# Patient Record
Sex: MALE | Race: WHITE | Employment: OTHER | URBAN - METROPOLITAN AREA
[De-identification: names, ages, dates, MRNs, and addresses within clinical notes are randomized per-mention and may not be internally consistent; named-entity substitution may affect disease eponyms.]

---

## 2024-02-08 ENCOUNTER — HOSPITAL ENCOUNTER (EMERGENCY)
Age: 84
Discharge: HOME | End: 2024-02-08
Attending: EMERGENCY MEDICINE
Payer: MEDICARE

## 2024-02-08 VITALS
SYSTOLIC BLOOD PRESSURE: 167 MMHG | BODY MASS INDEX: 27.2 KG/M2 | RESPIRATION RATE: 16 BRPM | DIASTOLIC BLOOD PRESSURE: 84 MMHG | OXYGEN SATURATION: 98 % | HEART RATE: 58 BPM | WEIGHT: 200.84 LBS | TEMPERATURE: 98.5 F | HEIGHT: 72 IN

## 2024-02-08 DIAGNOSIS — R41.89 COGNITIVE CHANGES: Primary | ICD-10-CM

## 2024-02-08 PROCEDURE — 99283 EMERGENCY DEPT VISIT LOW MDM: CPT

## 2024-02-08 ASSESSMENT — LIFESTYLE VARIABLES
HOW MANY STANDARD DRINKS CONTAINING ALCOHOL DO YOU HAVE ON A TYPICAL DAY: PATIENT DOES NOT DRINK
HOW OFTEN DO YOU HAVE A DRINK CONTAINING ALCOHOL: NEVER

## 2024-02-08 ASSESSMENT — PAIN - FUNCTIONAL ASSESSMENT: PAIN_FUNCTIONAL_ASSESSMENT: NONE - DENIES PAIN

## 2024-02-08 ASSESSMENT — ENCOUNTER SYMPTOMS
RHINORRHEA: 0
DIARRHEA: 0
ABDOMINAL PAIN: 0
COLOR CHANGE: 0
BACK PAIN: 0
VOMITING: 0
SHORTNESS OF BREATH: 0
COUGH: 0
NAUSEA: 0
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
## 2024-02-08 NOTE — ED NOTES
Contacted patient's representative to notify him of visit here and findings. Patient is being sent back to facility.  Still unable to contact facility regarding patients return.

## 2024-02-08 NOTE — ED NOTES
I have reviewed discharge instructions with the patient and POA.  The POA verbalized understanding.    Patient left ED via Discharge Method: stretcher to Skilled nursing facility with [facility].  Opportunity for questions and clarification provided.       Patient given 0 scripts.         To continue your aftercare when you leave the hospital, you may receive an automated call from our care team to check in on how you are doing.  This is a free service and part of our promise to provide the best care and service to meet your aftercare needs.” If you have questions, or wish to unsubscribe from this service please call [phone].  Thank you for Choosing our [facility] Emergency Department.

## 2024-02-08 NOTE — ED PROVIDER NOTES
Genitourinary:  Negative for dysuria, frequency and hematuria.   Musculoskeletal:  Negative for back pain and myalgias.   Skin:  Negative for color change and rash.   Neurological:  Negative for weakness and numbness.   Psychiatric/Behavioral:  Negative for agitation, hallucinations and suicidal ideas. The patient is not nervous/anxious.         Patient denies homicidal or harmful intention   All other systems reviewed and are negative.      Physical Exam     Vitals signs and nursing note reviewed:  Vitals:    02/08/24 1651   BP: (!) 167/84   Pulse: 58   Resp: 16   Temp: 98.5 °F (36.9 °C)   TempSrc: Oral   SpO2: 98%   Weight: 91.1 kg (200 lb 13.4 oz)   Height: 1.829 m (6')      Physical Exam  Vitals and nursing note reviewed.   Constitutional:       Appearance: Normal appearance.   HENT:      Head: Normocephalic and atraumatic.      Nose: Nose normal.      Mouth/Throat:      Mouth: Mucous membranes are moist.   Eyes:      Conjunctiva/sclera: Conjunctivae normal.      Pupils: Pupils are equal, round, and reactive to light.   Cardiovascular:      Rate and Rhythm: Normal rate and regular rhythm.      Pulses: Normal pulses.      Heart sounds: Normal heart sounds.   Pulmonary:      Effort: Pulmonary effort is normal.      Breath sounds: Normal breath sounds.   Abdominal:      General: There is no distension.      Palpations: Abdomen is soft.      Tenderness: There is no abdominal tenderness. There is no guarding or rebound.   Musculoskeletal:         General: Normal range of motion.   Skin:     General: Skin is warm and dry.   Neurological:      Mental Status: He is alert and oriented to person, place, and time.      Cranial Nerves: No cranial nerve deficit.      Sensory: No sensory deficit.      Motor: No weakness.      Coordination: Coordination normal.   Psychiatric:         Behavior: Behavior normal.          Procedures     Procedures    No orders of the defined types were placed in this encounter.    Dysphagia, unspecified type

## 2024-02-08 NOTE — DISCHARGE INSTRUCTIONS
Follow-up with Dr. Morataya and Dr. Marlow in the next couple of weeks for follow-up and recheck.  No medication recommendations at this time.  No sign of psychiatric instability at this time

## 2024-05-23 ENCOUNTER — HOSPITAL ENCOUNTER (EMERGENCY)
Age: 84
Discharge: HOME | End: 2024-05-23
Attending: STUDENT IN AN ORGANIZED HEALTH CARE EDUCATION/TRAINING PROGRAM
Payer: MEDICARE

## 2024-05-23 VITALS
TEMPERATURE: 98.2 F | WEIGHT: 185 LBS | RESPIRATION RATE: 16 BRPM | HEIGHT: 73 IN | HEART RATE: 56 BPM | DIASTOLIC BLOOD PRESSURE: 70 MMHG | OXYGEN SATURATION: 98 % | SYSTOLIC BLOOD PRESSURE: 122 MMHG | BODY MASS INDEX: 24.52 KG/M2

## 2024-05-23 DIAGNOSIS — R45.1 AGITATION: Primary | ICD-10-CM

## 2024-05-23 LAB
ALBUMIN SERPL-MCNC: 3.5 G/DL (ref 3.2–4.6)
ALBUMIN/GLOB SERPL: 1.2 (ref 1–1.9)
ALP SERPL-CCNC: 73 U/L (ref 40–129)
ALT SERPL-CCNC: 16 U/L (ref 12–65)
ANION GAP SERPL CALC-SCNC: 11 MMOL/L (ref 9–18)
APPEARANCE UR: CLEAR
AST SERPL-CCNC: 35 U/L (ref 15–37)
BACTERIA URNS QL MICRO: ABNORMAL /HPF
BASOPHILS # BLD: 0 K/UL (ref 0–0.2)
BASOPHILS NFR BLD: 1 % (ref 0–2)
BILIRUB SERPL-MCNC: <0.2 MG/DL (ref 0–1.2)
BILIRUB UR QL: NEGATIVE
BUN SERPL-MCNC: 25 MG/DL (ref 8–23)
CALCIUM SERPL-MCNC: 9.1 MG/DL (ref 8.8–10.2)
CASTS URNS QL MICRO: 0 /LPF
CHLORIDE SERPL-SCNC: 105 MMOL/L (ref 98–107)
CO2 SERPL-SCNC: 25 MMOL/L (ref 20–28)
COLOR UR: ABNORMAL
CREAT SERPL-MCNC: 1.06 MG/DL (ref 0.8–1.3)
CRYSTALS URNS QL MICRO: 0 /LPF
DIFFERENTIAL METHOD BLD: ABNORMAL
EOSINOPHIL # BLD: 0.3 K/UL (ref 0–0.8)
EOSINOPHIL NFR BLD: 4 % (ref 0.5–7.8)
EPI CELLS #/AREA URNS HPF: ABNORMAL /HPF
ERYTHROCYTE [DISTWIDTH] IN BLOOD BY AUTOMATED COUNT: 12.8 % (ref 11.9–14.6)
GLOBULIN SER CALC-MCNC: 2.9 G/DL (ref 2.3–3.5)
GLUCOSE SERPL-MCNC: 132 MG/DL (ref 70–99)
GLUCOSE UR STRIP.AUTO-MCNC: NEGATIVE MG/DL
HCT VFR BLD AUTO: 40.5 % (ref 41.1–50.3)
HGB BLD-MCNC: 13 G/DL (ref 13.6–17.2)
HGB UR QL STRIP: NEGATIVE
IMM GRANULOCYTES # BLD AUTO: 0 K/UL (ref 0–0.5)
IMM GRANULOCYTES NFR BLD AUTO: 0 % (ref 0–5)
KETONES UR QL STRIP.AUTO: NEGATIVE MG/DL
LEUKOCYTE ESTERASE UR QL STRIP.AUTO: NEGATIVE
LYMPHOCYTES # BLD: 2.8 K/UL (ref 0.5–4.6)
LYMPHOCYTES NFR BLD: 37 % (ref 13–44)
MCH RBC QN AUTO: 30.7 PG (ref 26.1–32.9)
MCHC RBC AUTO-ENTMCNC: 32.1 G/DL (ref 31.4–35)
MCV RBC AUTO: 95.5 FL (ref 82–102)
MONOCYTES # BLD: 0.8 K/UL (ref 0.1–1.3)
MONOCYTES NFR BLD: 11 % (ref 4–12)
MUCOUS THREADS URNS QL MICRO: ABNORMAL /LPF
NEUTS SEG # BLD: 3.7 K/UL (ref 1.7–8.2)
NEUTS SEG NFR BLD: 47 % (ref 43–78)
NITRITE UR QL STRIP.AUTO: NEGATIVE
NRBC # BLD: 0 K/UL (ref 0–0.2)
OTHER OBSERVATIONS: ABNORMAL
PH UR STRIP: 5.5 (ref 5–9)
PLATELET # BLD AUTO: 246 K/UL (ref 150–450)
PMV BLD AUTO: 9.4 FL (ref 9.4–12.3)
POTASSIUM SERPL-SCNC: ABNORMAL MMOL/L (ref 3.5–5.1)
PROT SERPL-MCNC: 6.5 G/DL (ref 6.3–8.2)
PROT UR STRIP-MCNC: NEGATIVE MG/DL
RBC # BLD AUTO: 4.24 M/UL (ref 4.23–5.6)
RBC #/AREA URNS HPF: ABNORMAL /HPF
SODIUM SERPL-SCNC: 141 MMOL/L (ref 136–145)
SP GR UR REFRACTOMETRY: 1.03 (ref 1–1.02)
URINE CULTURE IF INDICATED: ABNORMAL
UROBILINOGEN UR QL STRIP.AUTO: 1 EU/DL (ref 0.2–1)
WBC # BLD AUTO: 7.6 K/UL (ref 4.3–11.1)
WBC URNS QL MICRO: ABNORMAL /HPF

## 2024-05-23 PROCEDURE — 85025 COMPLETE CBC W/AUTO DIFF WBC: CPT

## 2024-05-23 PROCEDURE — 51701 INSERT BLADDER CATHETER: CPT

## 2024-05-23 PROCEDURE — 6370000000 HC RX 637 (ALT 250 FOR IP): Performed by: EMERGENCY MEDICINE

## 2024-05-23 PROCEDURE — 99283 EMERGENCY DEPT VISIT LOW MDM: CPT

## 2024-05-23 PROCEDURE — 81001 URINALYSIS AUTO W/SCOPE: CPT

## 2024-05-23 PROCEDURE — 80053 COMPREHEN METABOLIC PANEL: CPT

## 2024-05-23 RX ORDER — OLANZAPINE 5 MG/1
5 TABLET, ORALLY DISINTEGRATING ORAL
Status: COMPLETED | OUTPATIENT
Start: 2024-05-23 | End: 2024-05-23

## 2024-05-23 RX ADMIN — OLANZAPINE 5 MG: 5 TABLET, ORALLY DISINTEGRATING ORAL at 19:47

## 2024-05-23 ASSESSMENT — PAIN - FUNCTIONAL ASSESSMENT
PAIN_FUNCTIONAL_ASSESSMENT: 0-10
PAIN_FUNCTIONAL_ASSESSMENT: 0-10

## 2024-05-23 ASSESSMENT — PAIN SCALES - GENERAL
PAINLEVEL_OUTOF10: 0
PAINLEVEL_OUTOF10: 0

## 2024-05-23 ASSESSMENT — ENCOUNTER SYMPTOMS: ABDOMINAL PAIN: 0

## 2024-05-23 ASSESSMENT — LIFESTYLE VARIABLES
HOW OFTEN DO YOU HAVE A DRINK CONTAINING ALCOHOL: NEVER
HOW MANY STANDARD DRINKS CONTAINING ALCOHOL DO YOU HAVE ON A TYPICAL DAY: PATIENT DOES NOT DRINK
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
## 2024-05-23 NOTE — ED PROVIDER NOTES
YELLOW/STRAW      Appearance CLEAR      Specific Gravity, UA 1.028 (H) 1.001 - 1.023      pH, Urine 5.5 5.0 - 9.0      Protein, UA Negative NEG mg/dL    Glucose, Ur Negative mg/dL    Ketones, Urine Negative NEG mg/dL    Bilirubin, Urine Negative NEG      Blood, Urine Negative NEG      Urobilinogen, Urine 1.0 0.2 - 1.0 EU/dL    Nitrite, Urine Negative NEG      Leukocyte Esterase, Urine Negative NEG      WBC, UA PENDING /hpf    RBC, UA PENDING /hpf    BACTERIA, URINE PENDING /hpf    Urine Culture if Indicated PENDING    CBC with Auto Differential   Result Value Ref Range    WBC 7.6 4.3 - 11.1 K/uL    RBC 4.24 4.23 - 5.6 M/uL    Hemoglobin 13.0 (L) 13.6 - 17.2 g/dL    Hematocrit 40.5 (L) 41.1 - 50.3 %    MCV 95.5 82 - 102 FL    MCH 30.7 26.1 - 32.9 PG    MCHC 32.1 31.4 - 35.0 g/dL    RDW 12.8 11.9 - 14.6 %    Platelets 246 150 - 450 K/uL    MPV 9.4 9.4 - 12.3 FL    nRBC 0.00 0.0 - 0.2 K/uL    Differential Type AUTOMATED      Neutrophils % 47 43 - 78 %    Lymphocytes % 37 13 - 44 %    Monocytes % 11 4.0 - 12.0 %    Eosinophils % 4 0.5 - 7.8 %    Basophils % 1 0.0 - 2.0 %    Immature Granulocytes % 0 0.0 - 5.0 %    Neutrophils Absolute 3.7 1.7 - 8.2 K/UL    Lymphocytes Absolute 2.8 0.5 - 4.6 K/UL    Monocytes Absolute 0.8 0.1 - 1.3 K/UL    Eosinophils Absolute 0.3 0.0 - 0.8 K/UL    Basophils Absolute 0.0 0.0 - 0.2 K/UL    Immature Granulocytes Absolute 0.0 0.0 - 0.5 K/UL   CMP   Result Value Ref Range    Sodium 141 136 - 145 mmol/L    Potassium HEMOLYZED SPECIMEN 3.5 - 5.1 mmol/L    Chloride 105 98 - 107 mmol/L    CO2 25 20 - 28 mmol/L    Anion Gap 11 9 - 18 mmol/L    Glucose 132 (H) 70 - 99 mg/dL    BUN 25 (H) 8 - 23 MG/DL    Creatinine 1.06 0.80 - 1.30 MG/DL    Est, Glom Filt Rate 70 >60 ml/min/1.73m2    Calcium 9.1 8.8 - 10.2 MG/DL    Total Bilirubin <0.2 0.0 - 1.2 MG/DL    ALT 16 12 - 65 U/L    AST 35 15 - 37 U/L    Alk Phosphatase 73 40 - 129 U/L    Total Protein 6.5 6.3 - 8.2 g/dL    Albumin 3.5 3.2 - 4.6 g/dL

## 2024-05-24 NOTE — ED NOTES
Patient mobility status  with no difficulty. Provider aware     I have reviewed discharge instructions with the patient.  The patient verbalized understanding.    Patient left ED via Discharge Method: ambulatory to Home with [facility].    Opportunity for questions and clarification provided.     Patient given 0 scripts.           Willy Lopez RN  05/23/24 [id number]

## 2024-07-22 ENCOUNTER — APPOINTMENT (OUTPATIENT)
Dept: GENERAL RADIOLOGY | Age: 84
End: 2024-07-22
Payer: MEDICARE

## 2024-07-22 ENCOUNTER — HOSPITAL ENCOUNTER (EMERGENCY)
Age: 84
Discharge: ANOTHER HOSPITAL | End: 2024-07-23
Attending: EMERGENCY MEDICINE
Payer: MEDICARE

## 2024-07-22 VITALS
TEMPERATURE: 100.3 F | WEIGHT: 175 LBS | BODY MASS INDEX: 25.05 KG/M2 | HEIGHT: 70 IN | SYSTOLIC BLOOD PRESSURE: 135 MMHG | DIASTOLIC BLOOD PRESSURE: 69 MMHG | RESPIRATION RATE: 18 BRPM | OXYGEN SATURATION: 94 % | HEART RATE: 75 BPM

## 2024-07-22 DIAGNOSIS — R53.1 GENERALIZED WEAKNESS: ICD-10-CM

## 2024-07-22 DIAGNOSIS — Z78.9 ACTIVITY OF DAILY LIVING ALTERATION: ICD-10-CM
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
## 2024-07-22 DIAGNOSIS — R50.9 FEVER, UNSPECIFIED FEVER CAUSE: ICD-10-CM

## 2024-07-22 DIAGNOSIS — D72.829 LEUKOCYTOSIS, UNSPECIFIED TYPE: ICD-10-CM

## 2024-07-22 LAB
ALBUMIN SERPL-MCNC: 2.6 G/DL (ref 3.2–4.6)
ALBUMIN/GLOB SERPL: 0.8 (ref 1–1.9)
ALP SERPL-CCNC: 113 U/L (ref 40–129)
ALT SERPL-CCNC: 14 U/L (ref 12–65)
ANION GAP SERPL CALC-SCNC: 13 MMOL/L (ref 9–18)
AST SERPL-CCNC: 19 U/L (ref 15–37)
BASOPHILS # BLD: 0.1 K/UL (ref 0–0.2)
BASOPHILS NFR BLD: 0 % (ref 0–2)
BILIRUB SERPL-MCNC: 0.5 MG/DL (ref 0–1.2)
BILIRUB UR QL: NEGATIVE
BUN SERPL-MCNC: 14 MG/DL (ref 8–23)
CALCIUM SERPL-MCNC: 8.3 MG/DL (ref 8.8–10.2)
CHLORIDE SERPL-SCNC: 102 MMOL/L (ref 98–107)
CO2 SERPL-SCNC: 22 MMOL/L (ref 20–28)
CREAT SERPL-MCNC: 0.88 MG/DL (ref 0.8–1.3)
DIFFERENTIAL METHOD BLD: ABNORMAL
EOSINOPHIL # BLD: 0 K/UL (ref 0–0.8)
EOSINOPHIL NFR BLD: 0 % (ref 0.5–7.8)
ERYTHROCYTE [DISTWIDTH] IN BLOOD BY AUTOMATED COUNT: 12.5 % (ref 11.9–14.6)
GLOBULIN SER CALC-MCNC: 3.3 G/DL (ref 2.3–3.5)
GLUCOSE SERPL-MCNC: 114 MG/DL (ref 70–99)
GLUCOSE UR QL STRIP.AUTO: NEGATIVE MG/DL
HCT VFR BLD AUTO: 34.7 % (ref 41.1–50.3)
HGB BLD-MCNC: 11.2 G/DL (ref 13.6–17.2)
IMM GRANULOCYTES # BLD AUTO: 0.2 K/UL (ref 0–0.5)
IMM GRANULOCYTES NFR BLD AUTO: 1 % (ref 0–5)
KETONES UR-MCNC: ABNORMAL MG/DL
LACTATE SERPL-SCNC: 1 MMOL/L (ref 0.5–2)
LEUKOCYTE ESTERASE UR QL STRIP: NEGATIVE
LYMPHOCYTES # BLD: 1.5 K/UL (ref 0.5–4.6)
LYMPHOCYTES NFR BLD: 10 % (ref 13–44)
MAGNESIUM SERPL-MCNC: 2.1 MG/DL (ref 1.8–2.4)
MCH RBC QN AUTO: 30.5 PG (ref 26.1–32.9)
MCHC RBC AUTO-ENTMCNC: 32.3 G/DL (ref 31.4–35)
MCV RBC AUTO: 94.6 FL (ref 82–102)
MONOCYTES # BLD: 1.9 K/UL (ref 0.1–1.3)
MONOCYTES NFR BLD: 13 % (ref 4–12)
NEUTS SEG # BLD: 11.5 K/UL (ref 1.7–8.2)
NEUTS SEG NFR BLD: 76 % (ref 43–78)
NITRITE UR QL: NEGATIVE
NRBC # BLD: 0 K/UL (ref 0–0.2)
PH UR: 5.5 (ref 5–9)
PLATELET # BLD AUTO: 307 K/UL (ref 150–450)
PMV BLD AUTO: 9.4 FL (ref 9.4–12.3)
POTASSIUM SERPL-SCNC: 4 MMOL/L (ref 3.5–5.1)
PROCALCITONIN SERPL-MCNC: 0.21 NG/ML (ref 0–0.1)
PROT SERPL-MCNC: 5.8 G/DL (ref 6.3–8.2)
PROT UR QL: 30 MG/DL
RBC # BLD AUTO: 3.67 M/UL (ref 4.23–5.6)
RBC # UR STRIP: NEGATIVE
SARS-COV-2 RDRP RESP QL NAA+PROBE: NOT DETECTED
SERVICE CMNT-IMP: ABNORMAL
SODIUM SERPL-SCNC: 137 MMOL/L (ref 136–145)
SOURCE: NORMAL
SP GR UR: 1.02 (ref 1–1.02)
UROBILINOGEN UR QL: 4 EU/DL (ref 0.2–1)
WBC # BLD AUTO: 15.2 K/UL (ref 4.3–11.1)

## 2024-07-22 PROCEDURE — 84145 PROCALCITONIN (PCT): CPT

## 2024-07-22 PROCEDURE — 96367 TX/PROPH/DG ADDL SEQ IV INF: CPT

## 2024-07-22 PROCEDURE — 87086 URINE CULTURE/COLONY COUNT: CPT

## 2024-07-22 PROCEDURE — 99285 EMERGENCY DEPT VISIT HI MDM: CPT

## 2024-07-22 PROCEDURE — 96366 THER/PROPH/DIAG IV INF ADDON: CPT

## 2024-07-22 PROCEDURE — 6360000002 HC RX W HCPCS

## 2024-07-22 PROCEDURE — 2580000003 HC RX 258

## 2024-07-22 PROCEDURE — 85025 COMPLETE CBC W/AUTO DIFF WBC: CPT

## 2024-07-22 PROCEDURE — 80053 COMPREHEN METABOLIC PANEL: CPT

## 2024-07-22 PROCEDURE — 96365 THER/PROPH/DIAG IV INF INIT: CPT

## 2024-07-22 PROCEDURE — 81001 URINALYSIS AUTO W/SCOPE: CPT

## 2024-07-22 PROCEDURE — 36415 COLL VENOUS BLD VENIPUNCTURE: CPT

## 2024-07-22 PROCEDURE — 87635 SARS-COV-2 COVID-19 AMP PRB: CPT

## 2024-07-22 PROCEDURE — 83605 ASSAY OF LACTIC ACID: CPT

## 2024-07-22 PROCEDURE — 87040 BLOOD CULTURE FOR BACTERIA: CPT

## 2024-07-22 PROCEDURE — 83735 ASSAY OF MAGNESIUM: CPT

## 2024-07-22 PROCEDURE — 71046 X-RAY EXAM CHEST 2 VIEWS: CPT

## 2024-07-22 RX ORDER — DONEPEZIL HYDROCHLORIDE 10 MG/1
10 TABLET, FILM COATED ORAL DAILY
COMMUNITY
Start: 2022-10-24

## 2024-07-22 RX ORDER — HYDROXYZINE PAMOATE 50 MG/1
CAPSULE ORAL
COMMUNITY
Start: 2024-05-23

## 2024-07-22 RX ORDER — DIPHENHYDRAMINE HCL 25 MG
25 CAPSULE ORAL EVERY 6 HOURS PRN
COMMUNITY

## 2024-07-22 RX ORDER — LANOLIN ALCOHOL/MO/W.PET/CERES
3 CREAM (GRAM) TOPICAL DAILY
COMMUNITY

## 2024-07-22 RX ORDER — TRIAMCINOLONE ACETONIDE 0.25 MG/ML
LOTION TOPICAL
Status: ON HOLD | COMMUNITY
Start: 2023-01-12 | End: 2024-07-24 | Stop reason: HOSPADM

## 2024-07-22 RX ORDER — 0.9 % SODIUM CHLORIDE 0.9 %
1000 INTRAVENOUS SOLUTION INTRAVENOUS ONCE
Status: COMPLETED | OUTPATIENT
Start: 2024-07-22 | End: 2024-07-22

## 2024-07-22 RX ADMIN — CEFTRIAXONE SODIUM 2000 MG: 2 INJECTION, POWDER, FOR SOLUTION INTRAMUSCULAR; INTRAVENOUS at 21:08

## 2024-07-22 RX ADMIN — SODIUM CHLORIDE 1000 ML: 9 INJECTION, SOLUTION INTRAVENOUS at 20:13

## 2024-07-22 NOTE — ED TRIAGE NOTES
Pt's friend advises that pt lives in memory care SNF. Pt had an unwitnessed fall today as well as difficulty walking without dizziness. Pt CO chest congestion and back pain. Last dose of Tylenol approx x30 minutes PTA.

## 2024-07-22 NOTE — ED PROVIDER NOTES
Emergency Department Provider Note       PCP: Adonay Morataya MD   Age: 84 y.o.   Sex: male     DISPOSITION       No diagnosis found.    Medical Decision Making     Elderly male presents from SNF due to concerns of cough with fevers and unwitnessed fall.  Arrives with borderline fever at 100.3 F, otherwise with stable vital signs.  No neurodeficits or significant tender areas or evidence of significant trauma at the time of my evaluation.  Patient is not anticoagulated therefore imaging deferred at this time.  Will plan for broad workup given limited history.    Labs demonstrate moderate leukocytosis of 15,000.  Procalcitonin is minimally elevated.  Lactic acid within normal limits.  COVID-negative.  UA is negative.  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    At time of shift change, patient CMP is still pending due to multiple recollection attempts from hemolysis.    11:02 PM EDT The patient's care will be transitioned to Dr. Livingston.  At this time, the plan is likely admit the patient due to generalized weakness in the setting of leukocytosis and unwitnessed fall.  Please see that provider's documentation for further information.              I independently ordered and reviewed each unique test.     The patients assessment required an independent historian: son.  The reason they were needed is dementia and important historical information not provided by the patient.  I interpreted the X-rays ill-defined right upper lobe opacity which appears chronic.        History     Patient is an 84-year-old male with history of dementia residing in SNF memory care presenting to the emergency department accompanied by son with concern for persistent cough, fevers, and unwitnessed fall at the nursing facility.  History primarily provided by son due to history of dementia.  Son states

## 2024-07-23 ENCOUNTER — APPOINTMENT (OUTPATIENT)
Dept: CT IMAGING | Age: 84
End: 2024-07-23
Payer: MEDICARE

## 2024-07-23 ENCOUNTER — HOSPITAL ENCOUNTER (OUTPATIENT)
Age: 84
Setting detail: OBSERVATION
LOS: 1 days | Discharge: NURSING, REHAB OR LONG-TERM CARE | End: 2024-07-24
Attending: STUDENT IN AN ORGANIZED HEALTH CARE EDUCATION/TRAINING PROGRAM | Admitting: INTERNAL MEDICINE
Payer: MEDICARE

## 2024-07-23 PROBLEM — J18.9 PNEUMONIA DUE TO INFECTIOUS ORGANISM: Status: ACTIVE | Noted: 2024-07-23

## 2024-07-23 PROBLEM — J15.9 COMMUNITY ACQUIRED BACTERIAL PNEUMONIA: Status: ACTIVE | Noted: 2024-07-23

## 2024-07-23 LAB
BACTERIA URNS QL MICRO: 0 /HPF
CASTS URNS QL MICRO: 0 /LPF
CRYSTALS URNS QL MICRO: 0 /LPF
EPI CELLS #/AREA URNS HPF: ABNORMAL /HPF
MUCOUS THREADS URNS QL MICRO: ABNORMAL /LPF
RBC #/AREA URNS HPF: ABNORMAL /HPF
WBC URNS QL MICRO: ABNORMAL /HPF

## 2024-07-23 PROCEDURE — 97161 PT EVAL LOW COMPLEX 20 MIN: CPT

## 2024-07-23 PROCEDURE — 6370000000 HC RX 637 (ALT 250 FOR IP): Performed by: INTERNAL MEDICINE

## 2024-07-23 PROCEDURE — 96367 TX/PROPH/DG ADDL SEQ IV INF: CPT

## 2024-07-23 PROCEDURE — 2580000003 HC RX 258: Performed by: INTERNAL MEDICINE

## 2024-07-23 PROCEDURE — 6360000002 HC RX W HCPCS: Performed by: INTERNAL MEDICINE

## 2024-07-23 PROCEDURE — 70450 CT HEAD/BRAIN W/O DYE: CPT

## 2024-07-23 PROCEDURE — 71250 CT THORAX DX C-: CPT

## 2024-07-23 PROCEDURE — 6360000002 HC RX W HCPCS: Performed by: EMERGENCY MEDICINE

## 2024-07-23 PROCEDURE — 2580000003 HC RX 258: Performed by: EMERGENCY MEDICINE

## 2024-07-23 PROCEDURE — 1100000000 HC RM PRIVATE

## 2024-07-23 PROCEDURE — 97530 THERAPEUTIC ACTIVITIES: CPT

## 2024-07-23 RX ORDER — SODIUM CHLORIDE 0.9 % (FLUSH) 0.9 %
5-40 SYRINGE (ML) INJECTION EVERY 12 HOURS SCHEDULED
Status: DISCONTINUED | OUTPATIENT
Start: 2024-07-23 | End: 2024-07-24 | Stop reason: HOSPADM

## 2024-07-23 RX ORDER — SODIUM CHLORIDE 0.9 % (FLUSH) 0.9 %
5-40 SYRINGE (ML) INJECTION PRN
Status: DISCONTINUED | OUTPATIENT
Start: 2024-07-23 | End: 2024-07-24 | Stop reason: HOSPADM

## 2024-07-23 RX ORDER — GUAIFENESIN 600 MG/1
600 TABLET, EXTENDED RELEASE ORAL 2 TIMES DAILY
Status: DISCONTINUED | OUTPATIENT
Start: 2024-07-23 | End: 2024-07-23

## 2024-07-23 RX ORDER — AZITHROMYCIN 250 MG/1
500 TABLET, FILM COATED ORAL EVERY 24 HOURS
Status: DISCONTINUED | OUTPATIENT
Start: 2024-07-24 | End: 2024-07-23 | Stop reason: SDUPTHER

## 2024-07-23 RX ORDER — POLYETHYLENE GLYCOL 3350 17 G/17G
17 POWDER, FOR SOLUTION ORAL DAILY PRN
Status: DISCONTINUED | OUTPATIENT
Start: 2024-07-23 | End: 2024-07-24 | Stop reason: HOSPADM

## 2024-07-23 RX ORDER — OLANZAPINE 5 MG/1
10 TABLET, ORALLY DISINTEGRATING ORAL 2 TIMES DAILY PRN
Status: DISCONTINUED | OUTPATIENT
Start: 2024-07-23 | End: 2024-07-24 | Stop reason: HOSPADM

## 2024-07-23 RX ORDER — SODIUM CHLORIDE 9 MG/ML
INJECTION, SOLUTION INTRAVENOUS PRN
Status: DISCONTINUED | OUTPATIENT
Start: 2024-07-23 | End: 2024-07-24 | Stop reason: HOSPADM

## 2024-07-23 RX ORDER — LANOLIN ALCOHOL/MO/W.PET/CERES
3 CREAM (GRAM) TOPICAL NIGHTLY
Status: DISCONTINUED | OUTPATIENT
Start: 2024-07-23 | End: 2024-07-24 | Stop reason: HOSPADM

## 2024-07-23 RX ORDER — GUAIFENESIN/DEXTROMETHORPHAN 100-10MG/5
10 SYRUP ORAL EVERY 4 HOURS PRN
Status: DISCONTINUED | OUTPATIENT
Start: 2024-07-23 | End: 2024-07-24 | Stop reason: HOSPADM

## 2024-07-23 RX ORDER — QUETIAPINE FUMARATE 100 MG/1
50 TABLET, FILM COATED ORAL 2 TIMES DAILY PRN
Status: DISCONTINUED | OUTPATIENT
Start: 2024-07-23 | End: 2024-07-24 | Stop reason: HOSPADM

## 2024-07-23 RX ORDER — ACETAMINOPHEN 650 MG/1
650 SUPPOSITORY RECTAL EVERY 6 HOURS PRN
Status: DISCONTINUED | OUTPATIENT
Start: 2024-07-23 | End: 2024-07-24 | Stop reason: HOSPADM

## 2024-07-23 RX ORDER — DIPHENHYDRAMINE HCL 25 MG
25 CAPSULE ORAL EVERY 6 HOURS PRN
Status: DISCONTINUED | OUTPATIENT
Start: 2024-07-23 | End: 2024-07-24 | Stop reason: HOSPADM

## 2024-07-23 RX ORDER — MAGNESIUM HYDROXIDE/ALUMINUM HYDROXICE/SIMETHICONE 120; 1200; 1200 MG/30ML; MG/30ML; MG/30ML
30 SUSPENSION ORAL EVERY 6 HOURS PRN
Status: DISCONTINUED | OUTPATIENT
Start: 2024-07-23 | End: 2024-07-24 | Stop reason: HOSPADM

## 2024-07-23 RX ORDER — AZITHROMYCIN 250 MG/1
250 TABLET, FILM COATED ORAL DAILY
Status: DISCONTINUED | OUTPATIENT
Start: 2024-07-24 | End: 2024-07-24 | Stop reason: HOSPADM

## 2024-07-23 RX ORDER — DONEPEZIL HYDROCHLORIDE 5 MG/1
10 TABLET, FILM COATED ORAL NIGHTLY
Status: DISCONTINUED | OUTPATIENT
Start: 2024-07-23 | End: 2024-07-24 | Stop reason: HOSPADM

## 2024-07-23 RX ORDER — CEFDINIR 300 MG/1
300 CAPSULE ORAL EVERY 12 HOURS SCHEDULED
Status: DISCONTINUED | OUTPATIENT
Start: 2024-07-23 | End: 2024-07-24 | Stop reason: HOSPADM

## 2024-07-23 RX ORDER — FAMOTIDINE 20 MG/1
10 TABLET, FILM COATED ORAL 2 TIMES DAILY PRN
Status: DISCONTINUED | OUTPATIENT
Start: 2024-07-23 | End: 2024-07-24 | Stop reason: HOSPADM

## 2024-07-23 RX ORDER — BISACODYL 10 MG
10 SUPPOSITORY, RECTAL RECTAL DAILY PRN
Status: DISCONTINUED | OUTPATIENT
Start: 2024-07-23 | End: 2024-07-24 | Stop reason: HOSPADM

## 2024-07-23 RX ORDER — HYDROXYZINE PAMOATE 25 MG/1
25 CAPSULE ORAL 4 TIMES DAILY PRN
Status: DISCONTINUED | OUTPATIENT
Start: 2024-07-23 | End: 2024-07-24 | Stop reason: HOSPADM

## 2024-07-23 RX ORDER — SERTRALINE HYDROCHLORIDE 100 MG/1
100 TABLET, FILM COATED ORAL DAILY
Status: DISCONTINUED | OUTPATIENT
Start: 2024-07-23 | End: 2024-07-24 | Stop reason: HOSPADM

## 2024-07-23 RX ORDER — ALBUTEROL SULFATE 2.5 MG/3ML
2.5 SOLUTION RESPIRATORY (INHALATION) EVERY 4 HOURS PRN
Status: DISCONTINUED | OUTPATIENT
Start: 2024-07-23 | End: 2024-07-24 | Stop reason: HOSPADM

## 2024-07-23 RX ORDER — NICOTINE 21 MG/24HR
1 PATCH, TRANSDERMAL 24 HOURS TRANSDERMAL DAILY PRN
Status: DISCONTINUED | OUTPATIENT
Start: 2024-07-23 | End: 2024-07-24 | Stop reason: HOSPADM

## 2024-07-23 RX ORDER — ONDANSETRON 4 MG/1
4 TABLET, ORALLY DISINTEGRATING ORAL EVERY 8 HOURS PRN
Status: DISCONTINUED | OUTPATIENT
Start: 2024-07-23 | End: 2024-07-24 | Stop reason: HOSPADM

## 2024-07-23 RX ORDER — BENZONATATE 100 MG/1
100 CAPSULE ORAL 3 TIMES DAILY PRN
Status: DISCONTINUED | OUTPATIENT
Start: 2024-07-23 | End: 2024-07-24 | Stop reason: HOSPADM

## 2024-07-23 RX ORDER — ONDANSETRON 2 MG/ML
4 INJECTION INTRAMUSCULAR; INTRAVENOUS EVERY 6 HOURS PRN
Status: DISCONTINUED | OUTPATIENT
Start: 2024-07-23 | End: 2024-07-24 | Stop reason: HOSPADM

## 2024-07-23 RX ORDER — GUAIFENESIN 600 MG/1
1200 TABLET, EXTENDED RELEASE ORAL 2 TIMES DAILY
Status: DISCONTINUED | OUTPATIENT
Start: 2024-07-23 | End: 2024-07-24 | Stop reason: HOSPADM

## 2024-07-23 RX ORDER — ACETAMINOPHEN 325 MG/1
650 TABLET ORAL EVERY 6 HOURS PRN
Status: DISCONTINUED | OUTPATIENT
Start: 2024-07-23 | End: 2024-07-24 | Stop reason: HOSPADM

## 2024-07-23 RX ORDER — ENOXAPARIN SODIUM 100 MG/ML
40 INJECTION SUBCUTANEOUS DAILY
Status: DISCONTINUED | OUTPATIENT
Start: 2024-07-23 | End: 2024-07-24 | Stop reason: HOSPADM

## 2024-07-23 RX ADMIN — DONEPEZIL HYDROCHLORIDE 10 MG: 5 TABLET, FILM COATED ORAL at 05:02

## 2024-07-23 RX ADMIN — CEFTRIAXONE 1000 MG: 1 INJECTION, POWDER, FOR SOLUTION INTRAMUSCULAR; INTRAVENOUS at 05:03

## 2024-07-23 RX ADMIN — GUAIFENESIN 1200 MG: 600 TABLET ORAL at 08:33

## 2024-07-23 RX ADMIN — DONEPEZIL HYDROCHLORIDE 10 MG: 5 TABLET, FILM COATED ORAL at 21:49

## 2024-07-23 RX ADMIN — Medication 3 MG: at 21:49

## 2024-07-23 RX ADMIN — CEFDINIR 300 MG: 300 CAPSULE ORAL at 21:49

## 2024-07-23 RX ADMIN — VANCOMYCIN HYDROCHLORIDE 2000 MG: 10 INJECTION, POWDER, LYOPHILIZED, FOR SOLUTION INTRAVENOUS at 00:51

## 2024-07-23 RX ADMIN — SERTRALINE 100 MG: 100 TABLET, FILM COATED ORAL at 08:33

## 2024-07-23 RX ADMIN — QUETIAPINE FUMARATE 50 MG: 100 TABLET ORAL at 21:49

## 2024-07-23 RX ADMIN — GUAIFENESIN 600 MG: 600 TABLET ORAL at 05:03

## 2024-07-23 RX ADMIN — ENOXAPARIN SODIUM 40 MG: 100 INJECTION SUBCUTANEOUS at 08:33

## 2024-07-23 RX ADMIN — GUAIFENESIN 1200 MG: 600 TABLET ORAL at 21:49

## 2024-07-23 RX ADMIN — HYDROXYZINE PAMOATE 25 MG: 25 CAPSULE ORAL at 11:56

## 2024-07-23 RX ADMIN — AZITHROMYCIN MONOHYDRATE 500 MG: 500 INJECTION, POWDER, LYOPHILIZED, FOR SOLUTION INTRAVENOUS at 05:27

## 2024-07-23 RX ADMIN — OLANZAPINE 5 MG: 10 INJECTION, POWDER, FOR SOLUTION INTRAMUSCULAR at 16:18

## 2024-07-23 NOTE — H&P
Hospitalist History and Physical   Admit Date:  No admission date for patient encounter.   Name:  Ivana Damon   Age:  84 y.o.  Sex:  male  :  1940   MRN:  [id number]   Room:  /    Presenting/Chief Complaint: No chief complaint on file.     Reason(s) for Admission: Pneumonia [J18.9]  Community acquired bacterial pneumonia [J15.9]     History of Present Illness:   Ivana Damon is a 84 y.o. male with medical history of Alzheimer's disease, anxiety/depression presents from memory care from [facility] nursing [facility].  Patient apparently had an unwitnessed fall as well as difficulty walking without dizziness.  Patient complains of chest congestion and back pain last dose Tylenol was approximately 30 minutes prior to arrival.  Apparently patient was being taken to the dining room when this happened not sure if he hit his head or not at any rate I have requested CT of the head be ordered ordered along with CT of the chest to rule out any PE which the ER physician has obliged however they are not done at the time of my dictation.      Assessment & Plan:     Principal Problem:  Community acquired bacterial pneumonia  Patient is on ceftriaxone and vancomycin  Follow the cultures    Alzheimer disease  On Aricept 10 mg daily    Anxiety/depression  On hydroxyzine and Zoloft    PT/OT evals ordered?  Defer to primary team    Diet: ADULT DIET; Regular; 5 carb choices (75 gm/meal)    VTE prophylaxis: Lovenox    Pending test:  CT of the head without contrast    CT of the chest to rule out PE    Code status: Full Code      Non-peripheral Lines and Tubes (if present):             Hospital Problems:  Principal Problem:    Community acquired bacterial pneumonia  Resolved Problems:    * No resolved hospital problems. *        Objective:   No data found.         Estimated body mass index is 25.11 kg/m² as calculated from the following:    Height as of 24: 1.778 m (5' 10\").    Weight as of 24: 79.4 kg (175

## 2024-07-23 NOTE — CARE COORDINATION
07/23/24 1612   Service Assessment   Patient Orientation Person   Cognition Alert   History Provided By Friend   Primary Caregiver Other (Comment)  ([facility] staff)   Support Systems Friends/Neighbors   Patient's Healthcare Decision Maker is: Named in Scanned ACP Document   PCP Verified by CM Yes   Last Visit to PCP Within last 3 months   Prior Functional Level Independent in ADLs/IADLs   Current Functional Level Independent in ADLs/IADLs   Can patient return to prior living arrangement Yes   Financial Resources Medicare   Community Resources Assisted Living   Social/Functional History   Type of Home Assisted living   Condition of Participation: Discharge Planning   The Plan for Transition of Care is related to the following treatment goals: return Memory Care   The Patient and/or Patient Representative was provided with a Choice of Provider? Patient   The Patient and/Or Patient Representative agree with the Discharge Plan? Yes   Freedom of Choice list was provided with basic dialogue that supports the patient's individualized plan of care/goals, treatment preferences, and shares the quality data associated with the providers?  Yes     Assessment completed with Claudio BERMAN) via phone. Patient lives at [facility]. Patient is SBA but needs reminder/ cues due to dementia. Patient conde snot use any assistive devices at baseline. He does get PT/OT at the facility. Discharge plan is to return to memory care [facility].     Abdirashid YOUSSEF, ACM  [place]

## 2024-07-23 NOTE — ED NOTES
TRANSFER - OUT REPORT:    Verbal report given to MARLENI Looney on Roll Jeremyndollar  being transferred to [facility] for routine progression of patient care       Report consisted of patient's Situation, Background, Assessment and   Recommendations(SBAR).     Information from the following report(s) ED SBAR was reviewed with the receiving nurse.    Lines:   Peripheral IV Right Antecubital (Active)       Peripheral IV 07/22/24 Left Antecubital (Active)        Opportunity for questions and clarification was provided.      Patient transported with:  "[employer]

## 2024-07-24 VITALS
HEART RATE: 51 BPM | HEIGHT: 72 IN | SYSTOLIC BLOOD PRESSURE: 121 MMHG | WEIGHT: 188.93 LBS | BODY MASS INDEX: 25.59 KG/M2 | DIASTOLIC BLOOD PRESSURE: 66 MMHG | RESPIRATION RATE: 16 BRPM | TEMPERATURE: 97.2 F | OXYGEN SATURATION: 96 %

## 2024-07-24 PROBLEM — J15.9 BACTERIAL PNEUMONIA: Status: ACTIVE | Noted: 2024-07-24

## 2024-07-24 LAB
ANION GAP SERPL CALC-SCNC: 10 MMOL/L (ref 9–18)
BASOPHILS # BLD: 0.1 K/UL (ref 0–0.2)
BASOPHILS NFR BLD: 1 % (ref 0–2)
BUN SERPL-MCNC: 10 MG/DL (ref 8–23)
CALCIUM SERPL-MCNC: 8.6 MG/DL (ref 8.8–10.2)
CHLORIDE SERPL-SCNC: 109 MMOL/L (ref 98–107)
CO2 SERPL-SCNC: 23 MMOL/L (ref 20–28)
CREAT SERPL-MCNC: 0.77 MG/DL (ref 0.8–1.3)
DIFFERENTIAL METHOD BLD: ABNORMAL
EOSINOPHIL # BLD: 0.2 K/UL (ref 0–0.8)
EOSINOPHIL NFR BLD: 2 % (ref 0.5–7.8)
ERYTHROCYTE [DISTWIDTH] IN BLOOD BY AUTOMATED COUNT: 12.2 % (ref 11.9–14.6)
GLUCOSE SERPL-MCNC: 92 MG/DL (ref 70–99)
HCT VFR BLD AUTO: 34 % (ref 41.1–50.3)
HGB BLD-MCNC: 10.9 G/DL (ref 13.6–17.2)
IMM GRANULOCYTES # BLD AUTO: 0.3 K/UL (ref 0–0.5)
IMM GRANULOCYTES NFR BLD AUTO: 3 % (ref 0–5)
LYMPHOCYTES # BLD: 2 K/UL (ref 0.5–4.6)
LYMPHOCYTES NFR BLD: 19 % (ref 13–44)
MAGNESIUM SERPL-MCNC: 2 MG/DL (ref 1.8–2.4)
MCH RBC QN AUTO: 30.5 PG (ref 26.1–32.9)
MCHC RBC AUTO-ENTMCNC: 32.1 G/DL (ref 31.4–35)
MCV RBC AUTO: 95.2 FL (ref 82–102)
MONOCYTES # BLD: 1.4 K/UL (ref 0.1–1.3)
MONOCYTES NFR BLD: 13 % (ref 4–12)
MRSA DNA SPEC QL NAA+PROBE: NOT DETECTED
NEUTS SEG # BLD: 6.6 K/UL (ref 1.7–8.2)
NEUTS SEG NFR BLD: 62 % (ref 43–78)
NRBC # BLD: 0 K/UL (ref 0–0.2)
PLATELET # BLD AUTO: 290 K/UL (ref 150–450)
PMV BLD AUTO: 9.2 FL (ref 9.4–12.3)
POTASSIUM SERPL-SCNC: 3.5 MMOL/L (ref 3.5–5.1)
RBC # BLD AUTO: 3.57 M/UL (ref 4.23–5.6)
S AUREUS CPE NOSE QL NAA+PROBE: NOT DETECTED
SODIUM SERPL-SCNC: 141 MMOL/L (ref 136–145)
WBC # BLD AUTO: 10.5 K/UL (ref 4.3–11.1)

## 2024-07-24 PROCEDURE — 87641 MR-STAPH DNA AMP PROBE: CPT

## 2024-07-24 PROCEDURE — 83735 ASSAY OF MAGNESIUM: CPT

## 2024-07-24 PROCEDURE — 6370000000 HC RX 637 (ALT 250 FOR IP): Performed by: INTERNAL MEDICINE

## 2024-07-24 PROCEDURE — 80048 BASIC METABOLIC PNL TOTAL CA: CPT

## 2024-07-24 PROCEDURE — G0378 HOSPITAL OBSERVATION PER HR: HCPCS

## 2024-07-24 PROCEDURE — 6360000002 HC RX W HCPCS: Performed by: INTERNAL MEDICINE

## 2024-07-24 PROCEDURE — 85025 COMPLETE CBC W/AUTO DIFF WBC: CPT

## 2024-07-24 PROCEDURE — 36415 COLL VENOUS BLD VENIPUNCTURE: CPT

## 2024-07-24 RX ORDER — GUAIFENESIN 600 MG/1
1200 TABLET, EXTENDED RELEASE ORAL 2 TIMES DAILY
Qty: 24 TABLET | Refills: 0 | COMMUNITY
Start: 2024-07-24 | End: 2024-07-24

## 2024-07-24 RX ORDER — CEFDINIR 300 MG/1
300 CAPSULE ORAL EVERY 12 HOURS SCHEDULED
Qty: 12 CAPSULE | Refills: 0 | Status: SHIPPED
Start: 2024-07-24 | End: 2024-07-24

## 2024-07-24 RX ORDER — CEFDINIR 300 MG/1
300 CAPSULE ORAL EVERY 12 HOURS SCHEDULED
Qty: 12 CAPSULE | Refills: 0 | Status: SHIPPED | OUTPATIENT
Start: 2024-07-24 | End: 2024-07-30

## 2024-07-24 RX ORDER — AZITHROMYCIN 250 MG/1
250 TABLET, FILM COATED ORAL DAILY
Qty: 4 TABLET | Refills: 0 | Status: SHIPPED | OUTPATIENT
Start: 2024-07-25 | End: 2024-07-29

## 2024-07-24 RX ORDER — GUAIFENESIN 600 MG/1
1200 TABLET, EXTENDED RELEASE ORAL 2 TIMES DAILY
Qty: 24 TABLET | Refills: 0 | Status: SHIPPED | OUTPATIENT
Start: 2024-07-24 | End: 2024-07-30

## 2024-07-24 RX ORDER — AZITHROMYCIN 250 MG/1
250 TABLET, FILM COATED ORAL DAILY
Qty: 4 TABLET | Refills: 0 | Status: SHIPPED
Start: 2024-07-25 | End: 2024-07-24

## 2024-07-24 RX ADMIN — GUAIFENESIN 1200 MG: 600 TABLET ORAL at 08:25

## 2024-07-24 RX ADMIN — CEFDINIR 300 MG: 300 CAPSULE ORAL at 08:24

## 2024-07-24 RX ADMIN — SERTRALINE 100 MG: 100 TABLET, FILM COATED ORAL at 08:25

## 2024-07-24 RX ADMIN — ENOXAPARIN SODIUM 40 MG: 100 INJECTION SUBCUTANEOUS at 08:25

## 2024-07-24 RX ADMIN — AZITHROMYCIN DIHYDRATE 250 MG: 250 TABLET ORAL at 08:25

## 2024-07-24 NOTE — DISCHARGE SUMMARY
COVID-19, Rapid [[id number]] Collected: 07/22/24 2007    Order Status: Completed Specimen: Nasopharyngeal Updated: 07/22/24 2029     Source NASAL SWAB        SARS-CoV-2, Rapid Not detected        Comment:    The specimen is NEGATIVE for SARS-CoV-2, the novel coronavirus associated with COVID-19.  A negative result does not rule out COVID-19.     This test has been authorized by the FDA under an Emergency Use Authorization (EUA) for use by authorized laboratories.      Fact sheet for Healthcare Providers: https://www.fda.gov/media/313888/download  Fact sheet for Patients: https://www.fda.gov/media/698460/download     Methodology: Isothermal Nucleic Acid Amplification                 All Labs from Last 24 Hrs:  Recent Results (from the past 24 hour(s))   Basic Metabolic Panel w/ Reflex to MG    Collection Time: 07/24/24  6:05 AM   Result Value Ref Range    Sodium 141 136 - 145 mmol/L    Potassium 3.5 3.5 - 5.1 mmol/L    Chloride 109 (H) 98 - 107 mmol/L    CO2 23 20 - 28 mmol/L    Anion Gap 10 9 - 18 mmol/L    Glucose 92 70 - 99 mg/dL    BUN 10 8 - 23 MG/DL    Creatinine 0.77 (L) 0.80 - 1.30 MG/DL    Est, Glom Filt Rate 89 >60 ml/min/1.73m2    Calcium 8.6 (L) 8.8 - 10.2 MG/DL   CBC with Auto Differential    Collection Time: 07/24/24  6:05 AM   Result Value Ref Range    WBC 10.5 4.3 - 11.1 K/uL    RBC 3.57 (L) 4.23 - 5.6 M/uL    Hemoglobin 10.9 (L) 13.6 - 17.2 g/dL    Hematocrit 34.0 (L) 41.1 - 50.3 %    MCV 95.2 82 - 102 FL    MCH 30.5 26.1 - 32.9 PG    MCHC 32.1 31.4 - 35.0 g/dL    RDW 12.2 11.9 - 14.6 %    Platelets 290 150 - 450 K/uL    MPV 9.2 (L) 9.4 - 12.3 FL    nRBC 0.00 0.0 - 0.2 K/uL    Differential Type AUTOMATED      Neutrophils % 62 43 - 78 %    Lymphocytes % 19 13 - 44 %    Monocytes % 13 (H) 4.0 - 12.0 %    Eosinophils % 2 0.5 - 7.8 %    Basophils % 1 0.0 - 2.0 %    Immature Granulocytes % 3 0.0 - 5.0 %    Neutrophils Absolute 6.6 1.7 - 8.2 K/UL    Lymphocytes Absolute 2.0 0.5 - 4.6 K/UL    Monocytes

## 2024-07-24 NOTE — PROGRESS NOTES
4 Eyes Skin Assessment     NAME:  Ivana Damon  YOB: 1940  MEDICAL RECORD NUMBER:  [id number]    The patient is being assessed for  Admission    I agree that at least one RN has performed a thorough Head to Toe Skin Assessment on the patient. ALL assessment sites listed below have been assessed.      Areas assessed by both nurses:    Head, Face, Ears, Shoulders, Back, Chest, Arms, Elbows, Hands, Sacrum. Buttock, Coccyx, Ischium, and Legs. Feet and Heels        Does the Patient have a Wound? No noted wound(s)       Kyle Prevention initiated by RN: Yes  Wound Care Orders initiated by RN: No    Pressure Injury (Stage 3,4, Unstageable, DTI, NWPT, and Complex wounds) if present, place Wound referral order by RN under : No    New Ostomies, if present place, Ostomy referral order under : No     Nurse 1 eSignature: Electronically signed by Maura Monsalve RN on 7/23/24 at 4:18 AM EDT    **SHARE this note so that the co-signing nurse can place an eSignature**    Nurse 2 eSignature: {Esignature:[id number]}   
ACUTE PHYSICAL THERAPY GOALS:   (Developed with and agreed upon by patient and/or caregiver.)    (1.) Ivana Damon  will move from supine to sit and sit to supine , scoot up and down, and roll side to side with SUPERVISION within 7 treatment day(s).    (2.) Ivana Damon will transfer from bed to chair and chair to bed with SUPERVISION using the least restrictive device within 7 treatment day(s).    (3.) Ivana Damon will ambulate with SUPERVISION for 500 feet with the least restrictive device within 7 treatment day(s).   (4.) Ivana Damon will perform standing static and dynamic balance activities x 5 minutes with SUPERVISION to improve safety within 7 treatment day(s).  (5.) Ivana Damon will perform therapeutic exercises x 10 min for HEP with MINIMAL ASSIST to improve strength, endurance, and functional mobility within 7 treatment day(s).       PHYSICAL THERAPY Initial Assessment, Daily Note, and AM  (Link to Caseload Tracking: PT Visit Days : 1  Acknowledge Orders  Time In/Out  PT Charge Capture  Rehab Caseload Tracker    Ivana Damon is a 84 y.o. male   PRIMARY DIAGNOSIS: Community acquired bacterial pneumonia  Pneumonia [J18.9]  Community acquired bacterial pneumonia [J15.9]  Pneumonia due to infectious organism [J18.9]       Reason for Referral: Generalized Muscle Weakness (M62.81)  Difficulty in walking, Not elsewhere classified (R26.2)  Inpatient: Payor: [employer] MEDICARE / Plan: [employer] CHOICE-PPO MEDICARE / Product Type: *No Product type* /     ASSESSMENT:     REHAB RECOMMENDATIONS:   Recommendation to date pending progress:  Setting:  Return to memory care [facility]    Equipment:    None     ASSESSMENT:  Mr. Damon is a 84 year old male admitted to the hospital on 7/23/24 following unwitnessed fall and difficulty walking, found to have pneumonia. Pt with medical history of Alzheimer's disease. Prior to admission, pt living in a [facility] memory care unit, ambulatory without AD. At time of 
Hourly rounds performed this shift. Patient resting at this time with no complaints. Bed locked in lowest position, call light, and all personal belongings in pt reach. VS stable, IV patent. will give report to oncoming nurse.   
Hourly rounds performed this shift. Patient resting with no complaints at this time. Bed locked in lowest position, call light, and all personal belongings in pt reach. VS stable, IV patent. will give report to oncoming nurse.   
Pt's continuous telemetry was discontinued this shift. PT and OT were consulted today. Pt was placed on a regular diet and lovenox was added.     Pt has had in person sitter since mid morning. Pt removed IV this morning right before shift change. New IV was placed and pt removed IV an hour after placement.     Pt has been extremely confused, defiant, and unable to redirect all shift. Pt became worse and zyprexa was added and given once IM per orders. Provider notified of no IV access and authorized no IV. Pt's IV antibiotics were changed to PO.     All known needs met at this time. Bed is currently low, call light was left in reach, and pt was encouraged to ask for assistance. Pt was left resting in bed with no complaints. Will give bedside report to oncoming nurse.    
TRANSFER - IN REPORT:    Verbal report received from Anahi YEPEZ on Roll McEndollar  being received from [facility] ED for routine progression of patient care      Report consisted of patient's Situation, Background, Assessment and   Recommendations(SBAR).     Information from the following report(s) Nurse Handoff Report and ED SBAR was reviewed with the receiving nurse.    Opportunity for questions and clarification was provided.      Assessment completed upon patient's arrival to unit and care assumed.    
VANCO DAILY NOTE  [facility]   Pharmacy Pharmacokinetic Monitoring Service - Vancomycin    Consulting Provider: Dr. Rivera   Indication: CAP  Target Concentration: Goal AUC/CAYETANO 400-600 mg*hr/L  Day of Therapy: 0  Additional Antimicrobials: ceftriaxone, azithromycin    Pertinent Laboratory Values:   Wt Readings from Last 1 Encounters:   07/22/24 79.4 kg (175 lb)     Temp Readings from Last 1 Encounters:   07/22/24 100.3 °F (37.9 °C) (Oral)     Recent Labs     07/22/24 2007 07/22/24  2306   BUN  --  14   CREATININE  --  0.88   WBC 15.2*  --    PROCAL 0.21*  --    LACTA 1.0  --      Estimated Creatinine Clearance: 67 mL/min (based on SCr of 0.88 mg/dL).    No results found for: \"VANCOTROUGH\", \"VANCORANDOM\"    MRSA Nasal Swab: Was ordered by provider, awaiting results    Assessment:  Date/Time Dose Concentration AUC         Note: Serum concentrations collected for AUC dosing may appear elevated if collected in close proximity to the dose administered, this is not necessarily an indication of toxicity    Plan:  Dosing recommendations based on Bayesian software  Start vancomycin 2g IV x 1 dose followed by Vanc 1500 mg IV q24h  Anticipated AUC of 470 and trough concentration of 11.1 at steady state  Renal labs as indicated   Vancomycin concentrations will be ordered as clinically appropriate  Pharmacy will continue to monitor patient and adjust therapy as indicated    Thank you for the consult,  SHAVONNE ADLER [facility]    
glycol (GLYCOLAX) packet 17 g  17 g Oral Daily PRN    bisacodyl (DULCOLAX) suppository 10 mg  10 mg Rectal Daily PRN    acetaminophen (TYLENOL) tablet 650 mg  650 mg Oral Q6H PRN    Or    acetaminophen (TYLENOL) suppository 650 mg  650 mg Rectal Q6H PRN    guaiFENesin-dextromethorphan (ROBITUSSIN DM) 100-10 MG/5ML syrup 10 mL  10 mL Oral Q4H PRN    benzonatate (TESSALON) capsule 100 mg  100 mg Oral TID PRN    albuterol (PROVENTIL) (2.5 MG/3ML) 0.083% nebulizer solution 2.5 mg  2.5 mg Nebulization Q4H PRN    cefTRIAXone (ROCEPHIN) 1,000 mg in sterile water 10 mL IV syringe  1,000 mg IntraVENous Q24H    And    azithromycin (ZITHROMAX) 500 mg in sodium chloride 0.9 % 250 mL IVPB (Fsio2Smf)  500 mg IntraVENous Q24H    guaiFENesin (MUCINEX) extended release tablet 1,200 mg  1,200 mg Oral BID       Signed:  Charles Lazo MD    Part of this note may have been written by using a voice dictation software.  The note has been proof read but may still contain some grammatical/other typographical errors.

## 2024-07-24 NOTE — CARE COORDINATION
Patient discharging to [facility]. HAMILTON spoke with Tosin at [facility], she is aware of patients return. His POA, Claudio will be providing transportation. Packet arranged and placed in soft chart. Claudio will be here at 1500, and asks patient be brought to main entrance, nurse made aware. IMM signed 7/23/24.    Abdirashid YOUSSEF, ACM  [place]

## 2024-07-25 LAB
BACTERIA SPEC CULT: NORMAL
SERVICE CMNT-IMP: NORMAL

## 2024-07-27 LAB
BACTERIA SPEC CULT: NORMAL
BACTERIA SPEC CULT: NORMAL
SERVICE CMNT-IMP: NORMAL
SERVICE CMNT-IMP: NORMAL

## 2024-12-14 ENCOUNTER — APPOINTMENT (OUTPATIENT)
Dept: CT IMAGING | Age: 84
End: 2024-12-14
Payer: MEDICARE

## 2024-12-14 ENCOUNTER — HOSPITAL ENCOUNTER (EMERGENCY)
Age: 84
Discharge: HOME | End: 2024-12-14
Payer: MEDICARE

## 2024-12-14 VITALS
SYSTOLIC BLOOD PRESSURE: 118 MMHG | TEMPERATURE: 97.5 F | WEIGHT: 185 LBS | HEART RATE: 66 BPM | OXYGEN SATURATION: 100 % | DIASTOLIC BLOOD PRESSURE: 71 MMHG | BODY MASS INDEX: 25.06 KG/M2 | RESPIRATION RATE: 18 BRPM | HEIGHT: 72 IN
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
## 2024-12-14 LAB
ALBUMIN SERPL-MCNC: 3.2 G/DL (ref 3.2–4.6)
ALBUMIN/GLOB SERPL: 0.9 (ref 1–1.9)
ALP SERPL-CCNC: 100 U/L (ref 40–129)
ALT SERPL-CCNC: 17 U/L (ref 8–55)
ANION GAP SERPL CALC-SCNC: 10 MMOL/L (ref 7–16)
AST SERPL-CCNC: 28 U/L (ref 15–37)
BACTERIA URNS QL MICRO: NEGATIVE /HPF
BASOPHILS # BLD: 0.1 K/UL (ref 0–0.2)
BASOPHILS NFR BLD: 1 % (ref 0–2)
BILIRUB SERPL-MCNC: 0.2 MG/DL (ref 0–1.2)
BILIRUB UR QL: NEGATIVE
BUN SERPL-MCNC: 21 MG/DL (ref 8–23)
CALCIUM SERPL-MCNC: 8.8 MG/DL (ref 8.8–10.2)
CHLORIDE SERPL-SCNC: 106 MMOL/L (ref 98–107)
CO2 SERPL-SCNC: 27 MMOL/L (ref 20–29)
CREAT SERPL-MCNC: 1.17 MG/DL (ref 0.8–1.3)
DIFFERENTIAL METHOD BLD: ABNORMAL
EOSINOPHIL # BLD: 0.6 K/UL (ref 0–0.8)
EOSINOPHIL NFR BLD: 6 % (ref 0.5–7.8)
EPI CELLS #/AREA URNS HPF: NORMAL /HPF
ERYTHROCYTE [DISTWIDTH] IN BLOOD BY AUTOMATED COUNT: 12.5 % (ref 11.9–14.6)
GLOBULIN SER CALC-MCNC: 3.7 G/DL (ref 2.3–3.5)
GLUCOSE SERPL-MCNC: 150 MG/DL (ref 70–99)
GLUCOSE UR QL STRIP.AUTO: NEGATIVE MG/DL
HCT VFR BLD AUTO: 39.1 % (ref 41.1–50.3)
HGB BLD-MCNC: 12 G/DL (ref 13.6–17.2)
HYALINE CASTS URNS QL MICRO: NORMAL /LPF
IMM GRANULOCYTES # BLD AUTO: 0 K/UL (ref 0–0.5)
IMM GRANULOCYTES NFR BLD AUTO: 0 % (ref 0–5)
KETONES UR-MCNC: NEGATIVE MG/DL
LEUKOCYTE ESTERASE UR QL STRIP: NEGATIVE
LYMPHOCYTES # BLD: 1.9 K/UL (ref 0.5–4.6)
LYMPHOCYTES NFR BLD: 21 % (ref 13–44)
MCH RBC QN AUTO: 29.9 PG (ref 26.1–32.9)
MCHC RBC AUTO-ENTMCNC: 30.7 G/DL (ref 31.4–35)
MCV RBC AUTO: 97.5 FL (ref 82–102)
MONOCYTES # BLD: 0.6 K/UL (ref 0.1–1.3)
MONOCYTES NFR BLD: 7 % (ref 4–12)
NEUTS SEG # BLD: 5.7 K/UL (ref 1.7–8.2)
NEUTS SEG NFR BLD: 64 % (ref 43–78)
NITRITE UR QL: NEGATIVE
NRBC # BLD: 0 K/UL (ref 0–0.2)
PH UR: 6 (ref 5–9)
PLATELET # BLD AUTO: 315 K/UL (ref 150–450)
PMV BLD AUTO: 8.8 FL (ref 9.4–12.3)
POTASSIUM SERPL-SCNC: 4.1 MMOL/L (ref 3.5–5.1)
PROT SERPL-MCNC: 6.9 G/DL (ref 6.3–8.2)
PROT UR QL: NEGATIVE MG/DL
RBC # BLD AUTO: 4.01 M/UL (ref 4.23–5.6)
RBC # UR STRIP: ABNORMAL
RBC #/AREA URNS HPF: NORMAL /HPF
SERVICE CMNT-IMP: ABNORMAL
SODIUM SERPL-SCNC: 143 MMOL/L (ref 136–145)
SP GR UR: >1.03 (ref 1–1.02)
TSH W FREE THYROID IF ABNORMAL: 2.51 UIU/ML (ref 0.27–4.2)
UROBILINOGEN UR QL: 1 EU/DL (ref 0.2–1)
WBC # BLD AUTO: 8.9 K/UL (ref 4.3–11.1)
WBC URNS QL MICRO: NORMAL /HPF

## 2024-12-14 PROCEDURE — 85025 COMPLETE CBC W/AUTO DIFF WBC: CPT

## 2024-12-14 PROCEDURE — 84443 ASSAY THYROID STIM HORMONE: CPT

## 2024-12-14 PROCEDURE — 72125 CT NECK SPINE W/O DYE: CPT

## 2024-12-14 PROCEDURE — 81001 URINALYSIS AUTO W/SCOPE: CPT

## 2024-12-14 PROCEDURE — 70450 CT HEAD/BRAIN W/O DYE: CPT

## 2024-12-14 PROCEDURE — 99284 EMERGENCY DEPT VISIT MOD MDM: CPT

## 2024-12-14 PROCEDURE — 80053 COMPREHEN METABOLIC PANEL: CPT

## 2024-12-14 RX ORDER — ONDANSETRON 4 MG/1
4 TABLET, ORALLY DISINTEGRATING ORAL EVERY 30 MIN PRN
Status: DISCONTINUED | OUTPATIENT
Start: 2024-12-14 | End: 2024-12-14 | Stop reason: HOSPADM

## 2024-12-14 RX ORDER — ONDANSETRON 4 MG/1
4 TABLET, ORALLY DISINTEGRATING ORAL ONCE
Status: DISCONTINUED | OUTPATIENT
Start: 2024-12-14 | End: 2024-12-14 | Stop reason: HOSPADM

## 2024-12-14 ASSESSMENT — PAIN - FUNCTIONAL ASSESSMENT
PAIN_FUNCTIONAL_ASSESSMENT: NONE - DENIES PAIN
PAIN_FUNCTIONAL_ASSESSMENT: 0-10

## 2024-12-14 ASSESSMENT — VISUAL ACUITY: OU: 1

## 2024-12-14 ASSESSMENT — ENCOUNTER SYMPTOMS
COUGH: 0
SHORTNESS OF BREATH: 0
PHOTOPHOBIA: 0
FACIAL SWELLING: 0
VOMITING: 0
ABDOMINAL PAIN: 0
TROUBLE SWALLOWING: 0

## 2024-12-14 ASSESSMENT — PAIN SCALES - GENERAL: PAINLEVEL_OUTOF10: 0
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
## 2024-12-14 NOTE — ED NOTES
Patient mobility status  with no difficulty.     I have reviewed discharge instructions with the patient and caregiver.The patient and caregiver verbalized understanding.    Patient left ED via Discharge Method: ambulatory to Skilled nursing facility with Extended Family:.    Opportunity for questions and clarification provided.     Patient given 0 scripts.